# Patient Record
Sex: FEMALE | Race: WHITE | NOT HISPANIC OR LATINO | URBAN - METROPOLITAN AREA
[De-identification: names, ages, dates, MRNs, and addresses within clinical notes are randomized per-mention and may not be internally consistent; named-entity substitution may affect disease eponyms.]

---

## 2024-09-12 ENCOUNTER — OFFICE VISIT (OUTPATIENT)
Dept: URGENT CARE | Age: 54
End: 2024-09-12
Payer: COMMERCIAL

## 2024-09-12 VITALS
SYSTOLIC BLOOD PRESSURE: 139 MMHG | DIASTOLIC BLOOD PRESSURE: 86 MMHG | RESPIRATION RATE: 15 BRPM | HEART RATE: 76 BPM | BODY MASS INDEX: 32.49 KG/M2 | WEIGHT: 195 LBS | TEMPERATURE: 97.8 F | OXYGEN SATURATION: 99 % | HEIGHT: 65 IN

## 2024-09-12 DIAGNOSIS — L30.9 DERMATITIS: Primary | ICD-10-CM

## 2024-09-12 RX ORDER — HYDROCORTISONE 25 MG/G
OINTMENT TOPICAL 2 TIMES DAILY
Qty: 30 G | Refills: 0 | Status: SHIPPED | OUTPATIENT
Start: 2024-09-12 | End: 2024-09-19

## 2024-09-12 RX ORDER — PREDNISONE 20 MG/1
TABLET ORAL
Qty: 8 TABLET | Refills: 0 | Status: SHIPPED | OUTPATIENT
Start: 2024-09-12

## 2024-09-12 NOTE — PROGRESS NOTES
"Subjective   Patient ID: Nazia Kahn is a 54 y.o. female. They present today with a chief complaint of Rash (Rash 6x days. ).    History of Present Illness  Pt presents with rash to her right arm and lower back x 6 days. No drainage. States this morning rash on arm worsened. Itching and erythema noted. Has used OTC medication with no improvement. Unclear etiology at this time. No new medications, products or food. No recent travel. No fever or swollen lymph node. No other associated symptoms or concerns to address.       Rash        Past Medical History  Allergies as of 09/12/2024    (Not on File)       (Not in a hospital admission)       No past medical history on file.    Past Surgical History:   Procedure Laterality Date    US ASPIRATION INJECTION INTERMEDIATE JOINT  2/11/2022    US ASPIRATION INJECTION INTERMEDIATE JOINT 2/11/2022 [facility]            Review of Systems  Review of Systems   Skin:  Positive for rash.     10 point ROS completed and all are negative other than what is stated in the current HPI                               Objective    Vitals:    09/12/24 1823   BP: 139/86   Pulse: 76   Resp: 15   Temp: 36.6 °C (97.8 °F)   SpO2: 99%   Weight: 88.5 kg (195 lb)   Height: 1.651 m (5' 5\")     No LMP recorded.    Physical Exam  Constitutional:       Appearance: Normal appearance.   Cardiovascular:      Rate and Rhythm: Normal rate and regular rhythm.      Pulses: Normal pulses.      Heart sounds: Normal heart sounds.   Pulmonary:      Effort: Pulmonary effort is normal.      Breath sounds: Normal breath sounds. No wheezing.   Musculoskeletal:      Cervical back: Neck supple.   Lymphadenopathy:      Cervical: No cervical adenopathy.   Skin:     General: Skin is warm and dry.      Findings: Erythema and rash present.      Comments: No drainage or s/s of infection; no other abnormalities noted. Rash to right and left FA and to the lower back.   Neurological:      Mental Status: She is alert. "         Procedures    Point of Care Test & Imaging Results from this visit  Results for orders placed or performed in visit on 11/17/19   Urinalysis with Culture if Indicated   Result Value Ref Range    Color, Urine YELLOW STRAW,YELLOW    Appearance, Urine CLEAR CLEAR    Specific Gravity, Urine 1.010 1.005 - 1.035    pH, Urine 7.0 5.0 - 8.0    Protein, Urine NEGATIVE NEGATIVE mg/dL    Glucose, Urine NEGATIVE NEGATIVE mg/dL    Blood, Urine NEGATIVE NEGATIVE    Ketones, Urine NEGATIVE NEGATIVE mg/dL    Bilirubin, Urine NEGATIVE NEGATIVE    Urobilinogen, Urine <2.0 0.0 - 1.9 mg/dL    Nitrite, Urine NEGATIVE NEGATIVE    Leukocyte Esterase, Urine NEGATIVE NEGATIVE   Human Chorionic Gonadotropin   Result Value Ref Range    hCG Quantitative <2 mIU/mL   Comprehensive Metabolic Panel   Result Value Ref Range    Glucose 101 (H) 74 - 99 mg/dL    Sodium 137 136 - 145 mmol/L    Potassium 4.4 3.5 - 5.3 mmol/L    Chloride 103 98 - 107 mmol/L    Bicarbonate 28 21 - 32 mmol/L    Anion Gap 10 10 - 20 mmol/L    Urea Nitrogen 13 6 - 23 mg/dL    Creatinine 0.77 0.50 - 1.05 mg/dL    GLOMERULAR FILTRATION RATE-NON AFRICAN AMERICAN >60 >60 mL/min/1.73m2    GLOMERULAR FILTRATION RATE- >60 >60 mL/min/1.73m2    Calcium 9.1 8.6 - 10.3 mg/dL    Albumin 4.2 3.4 - 5.0 g/dL    Alkaline Phosphatase 52 33 - 110 U/L    Total Protein 6.6 6.4 - 8.2 g/dL    AST 10 9 - 39 U/L    Total Bilirubin 0.5 0.0 - 1.2 mg/dL    ALT (SGPT) 10 7 - 45 U/L   CBC and Auto Differential   Result Value Ref Range    WBC 11.4 (H) 4.4 - 11.3 x10E9/L    nRBC 0.0 0.0 - 0.0 /100 WBC    RBC 4.70 4.00 - 5.20 x10E12/L    Hemoglobin 13.7 12.0 - 16.0 g/dL    Hematocrit 43.2 36.0 - 46.0 %    MCV 92 80 - 100 fL    MCHC 31.7 (L) 32.0 - 36.0 g/dL    Platelets 365 150 - 450 x10E9/L    RDW 13.2 11.5 - 14.5 %    Neutrophils % 81.7 40.0 - 80.0 %    Immature Granulocytes %, Automated 0.4 0.0 - 0.9 %    Lymphocytes % 8.3 13.0 - 44.0 %    Monocytes % 8.4 2.0 - 10.0 %     Eosinophils % 0.9 0.0 - 6.0 %    Basophils % 0.3 0.0 - 2.0 %    Neutrophils Absolute 9.30 (H) 1.20 - 7.70 x10E9/L    Lymphocytes Absolute 0.94 (L) 1.20 - 4.80 x10E9/L    Monocytes Absolute 0.95 0.10 - 1.00 x10E9/L    Eosinophils Absolute 0.10 0.00 - 0.70 x10E9/L    Basophils Absolute 0.03 0.00 - 0.10 x10E9/L     No results found.    Diagnostic study results (if any) were reviewed by RADHA Roberts.    Assessment/Plan   Allergies, medications, history, and pertinent labs/EKGs/Imaging reviewed by RADHA Roberts.     Medical Decision Making      Orders and Diagnoses  There are no diagnoses linked to this encounter.    Medical Admin Record      Follow Up Instructions  Follow up with PCP next week for re-evaluation; if no improvement in 3 days can follow-up in the [facility]    Patient disposition: Home    Electronically signed by RADHA Roberts  7:02 PM

## 2024-09-12 NOTE — PATIENT INSTRUCTIONS
- Take medications as prescribed  - Cool showers  - Wash all linens  - Advised on s/s to seek emergent care for  - Follow-up back in [facility] in 3 days if no better